# Patient Record
Sex: MALE
[De-identification: names, ages, dates, MRNs, and addresses within clinical notes are randomized per-mention and may not be internally consistent; named-entity substitution may affect disease eponyms.]

---

## 2024-05-06 ENCOUNTER — APPOINTMENT (OUTPATIENT)
Dept: ORTHOPEDIC SURGERY | Facility: CLINIC | Age: 54
End: 2024-05-06
Payer: COMMERCIAL

## 2024-05-06 PROBLEM — Z00.00 ENCOUNTER FOR PREVENTIVE HEALTH EXAMINATION: Status: ACTIVE | Noted: 2024-05-06

## 2024-05-06 PROCEDURE — 73562 X-RAY EXAM OF KNEE 3: CPT | Mod: LT

## 2024-05-06 PROCEDURE — 99204 OFFICE O/P NEW MOD 45 MIN: CPT

## 2024-05-06 RX ORDER — MELOXICAM 15 MG/1
15 TABLET ORAL DAILY
Qty: 30 | Refills: 1 | Status: ACTIVE | COMMUNITY
Start: 2024-05-06 | End: 1900-01-01

## 2024-05-06 NOTE — HISTORY OF PRESENT ILLNESS
[de-identified] : Patient is here for evaluation of his left knee he injured it on 914 when he twisted and felt pain 7 out of 10 with activities he denies any medical issues denies any allergies does take Crestor for high cholesterol denies smoking use he weighs 210 pounds he is 5 foot 9 works a desk job physical exam left knee  Has a large effusion guarded range of motion positive Douglas's medially nontender laterally ligaments are stable negative Homans' sign no erythema, opposite right knee has no effusion full range of motion nontender over the joint line stable  X-rays taken today 2 views AP lateral standing x-rays of his left knee in the office showing well-maintained joint spaces no soft tissue calcifications  Diagnosis is left knee medial meniscal tear  Due to the significant pain loss of motion and effusion recommend an MRI, physical therapy, as well as home exercises were given from the [facility] exercise sheet, as well as prescription strength anti-inflammatory side effects discussed I will see him back in 3 weeks

## 2024-05-16 ENCOUNTER — APPOINTMENT (OUTPATIENT)
Dept: MRI IMAGING | Facility: CLINIC | Age: 54
End: 2024-05-16
Payer: COMMERCIAL

## 2024-05-16 PROCEDURE — 73721 MRI JNT OF LWR EXTRE W/O DYE: CPT | Mod: LT

## 2024-05-19 ENCOUNTER — NON-APPOINTMENT (OUTPATIENT)
Age: 54
End: 2024-05-19

## 2024-06-03 ENCOUNTER — APPOINTMENT (OUTPATIENT)
Dept: ORTHOPEDIC SURGERY | Facility: CLINIC | Age: 54
End: 2024-06-03
Payer: COMMERCIAL

## 2024-06-03 PROCEDURE — 99214 OFFICE O/P EST MOD 30 MIN: CPT

## 2024-06-03 NOTE — HISTORY OF PRESENT ILLNESS
[de-identified] : Patient is here for evaluation of the left knee MRI with a flap tear of the medial meniscus he still having symptoms with that difficulty with squatting with pain as well as swelling he tried home exercises of stretching and strengthening of the hamstrings and quads as well as icing and heat with no relief and tried anti-inflammatories with no relief  Physical exam left knee has an effusion positive Douglas's medially loss of flexion 20 degrees compared to the opposite knee ligaments are stable negative Homans' sign  Diagnosis left knee medial meniscal tear flap tear confirmed on MRI  Discussed no treatment nonoperative and operative would like to have the surgery done he understands all risk and benefits of the surgery including but not limited to DVT PE continued pain worsening pain he does work remotely from home I will schedule him for the left knee arthroscopy partial medial meniscectomy he is traveling to [place] mid July  Surgical Discussion (general)  The patient was advised of the diagnosis.  The natural history of the pathology was explained in full to the patient in layman's terms. All questions were answered.  The risks and benefits of surgical and non-surgical treatment alternatives were explained in full to the patient.   The patient demonstrated a full understanding of the surgical and non-surgical options.  The risks of surgery were outlined in full to the patient including but not limited to bleeding, scarring, infection, sepsis, neurologic injury, vascular injury, failure to resolve symptoms, symptom recurrence, the need for further surgery, non-healing, wound breakdown, deep vein thrombosis, pulmonary embolism, spontaneous osteonecrosis, anesthesia complications and even death.  The patient understood all the risks and accepted them and understood that other complications could occur that are not mentioned above.  The intraoperative plan, post-operative plan, post-operative expectations and limitations were explained in full.  Expectations from non-surgical treatment were explained in full as well.  The patient demonstrated a complete understanding of the treatment alternatives and requested the above-mentioned procedure.  This will be scheduled accordingly.

## 2024-06-07 ENCOUNTER — NON-APPOINTMENT (OUTPATIENT)
Age: 54
End: 2024-06-07

## 2024-06-07 ENCOUNTER — APPOINTMENT (OUTPATIENT)
Dept: ORTHOPEDIC SURGERY | Facility: AMBULATORY SURGERY CENTER | Age: 54
End: 2024-06-07
Payer: COMMERCIAL

## 2024-06-07 DIAGNOSIS — S83.232A COMPLEX TEAR OF MEDIAL MENISCUS, CURRENT INJURY, LEFT KNEE, INITIAL ENCOUNTER: ICD-10-CM

## 2024-06-07 PROCEDURE — 29881 ARTHRS KNE SRG MNISECTMY M/L: CPT | Mod: LT

## 2024-06-07 RX ORDER — TRAMADOL HYDROCHLORIDE 50 MG/1
50 TABLET, COATED ORAL
Qty: 30 | Refills: 0 | Status: ACTIVE | COMMUNITY
Start: 2024-06-07 | End: 1900-01-01

## 2024-06-07 NOTE — PROCEDURE
[FreeTextEntry3] : left knee scope med men , grade 2 mfc ,msi, rehab stretch strength quad hams,[id number]

## 2024-06-17 ENCOUNTER — APPOINTMENT (OUTPATIENT)
Dept: ORTHOPEDIC SURGERY | Facility: CLINIC | Age: 54
End: 2024-06-17
Payer: COMMERCIAL

## 2024-06-17 ENCOUNTER — APPOINTMENT (OUTPATIENT)
Dept: ORTHOPEDIC SURGERY | Facility: CLINIC | Age: 54
End: 2024-06-17

## 2024-06-17 DIAGNOSIS — S83.232D COMPLEX TEAR OF MEDIAL MENISCUS, CURRENT INJURY, LEFT KNEE, SUBSEQUENT ENCOUNTER: ICD-10-CM

## 2024-06-17 PROCEDURE — 99024 POSTOP FOLLOW-UP VISIT: CPT

## 2024-06-17 NOTE — DISCUSSION/SUMMARY
[de-identified] : Today the sutures were removed, Steri-Strips were placed.  He can weight-bear as tolerated.  I recommend starting formal physical therapy, Rx provided for that.  I will see him back in 2 months for further evaluation.

## 2024-06-17 NOTE — IMAGING
[de-identified] : Physical exam of the left knee: No effusion, no erythema, no ecchymosis.  The surgical portals are healing well.  Clean, dry, intact.  No surrounding erythema or drainage noted.  Full extension, flexion to 110 degrees.  No calf pain, negative Homans' sign.  Intact to light touch, nonantalgic gait.

## 2024-06-17 NOTE — HISTORY OF PRESENT ILLNESS
[de-identified] : The patient is a 54-year-old male here for a subsequent reevaluation of his left knee.  He is status post left knee arthroscopy, partial medial meniscectomy on 6/7/2024.  This is his initial postoperative visit.  He is getting some pain in the front of his knee.

## 2024-07-08 ENCOUNTER — RX RENEWAL (OUTPATIENT)
Age: 54
End: 2024-07-08

## 2024-09-10 ENCOUNTER — APPOINTMENT (OUTPATIENT)
Dept: ORTHOPEDIC SURGERY | Facility: CLINIC | Age: 54
End: 2024-09-10
Payer: COMMERCIAL

## 2024-09-10 DIAGNOSIS — S83.232D COMPLEX TEAR OF MEDIAL MENISCUS, CURRENT INJURY, LEFT KNEE, SUBSEQUENT ENCOUNTER: ICD-10-CM

## 2024-09-10 PROCEDURE — 99203 OFFICE O/P NEW LOW 30 MIN: CPT

## 2024-09-10 NOTE — IMAGING
[de-identified] : Physical exam of the left knee: No effusion, no erythema, no ecchymosis.  Full range of motion without pain.  No tenderness to palpation over the medial or lateral joint lines.  No tenderness to palpation over the collateral ligaments or the anterior aspect of the left knee.  Stable to varus and valgus stress testing.  Negative Komal's testing.  Intact to light touch, nonantalgic gait.

## 2024-09-10 NOTE — HISTORY OF PRESENT ILLNESS
[de-identified] : The patient is a 54-year-old male here for a subsequent reevaluation of his left knee.  He is status post left knee arthroscopy, partial medial meniscectomy on 6/7/2024.  His knee is doing very well.  He states his knee feels excellent, no pain whatsoever.  He is very happy with the way his knee feels.

## 2024-09-10 NOTE — DISCUSSION/SUMMARY
[de-identified] : At this point his knee is doing very well, no pain whatsoever.  We will see him back on as-needed basis for his left knee.